# Patient Record
Sex: FEMALE | Race: WHITE | Employment: UNEMPLOYED | URBAN - METROPOLITAN AREA
[De-identification: names, ages, dates, MRNs, and addresses within clinical notes are randomized per-mention and may not be internally consistent; named-entity substitution may affect disease eponyms.]

---
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
## 2022-06-15 NOTE — DISCHARGE INSTRUCTIONS
Prednisone 40 mg daily in the morning for 5 days.  Albuterol inhaler 2 puffs every 4-6 hours as needed for wheezing.  Follow up [facility].  Prescription provided for Epipen device if needed for severe nut allergy. You must seek immediate care in an ER or call 911 if you have any reaction severe enough to need this medication.  
COUGH